# Patient Record
Sex: FEMALE | Race: WHITE | NOT HISPANIC OR LATINO | Employment: FULL TIME | URBAN - METROPOLITAN AREA
[De-identification: names, ages, dates, MRNs, and addresses within clinical notes are randomized per-mention and may not be internally consistent; named-entity substitution may affect disease eponyms.]

---

## 2025-03-21 ENCOUNTER — OFFICE VISIT (OUTPATIENT)
Dept: PAIN MEDICINE | Facility: CLINIC | Age: 75
End: 2025-03-21
Payer: MEDICARE

## 2025-03-21 DIAGNOSIS — M47.816 LUMBAR SPONDYLOSIS: Primary | ICD-10-CM

## 2025-03-21 DIAGNOSIS — M47.812 CERVICAL SPONDYLOSIS WITHOUT MYELOPATHY: ICD-10-CM

## 2025-03-21 PROBLEM — K74.60 CIRRHOSIS OF LIVER WITHOUT ASCITES (MULTI): Status: ACTIVE | Noted: 2023-01-09

## 2025-03-21 PROBLEM — K92.2 GI BLEED: Status: ACTIVE | Noted: 2021-02-12

## 2025-03-21 PROBLEM — I10 ESSENTIAL HYPERTENSION: Chronic | Status: ACTIVE | Noted: 2021-02-12

## 2025-03-21 PROCEDURE — 99214 OFFICE O/P EST MOD 30 MIN: CPT | Performed by: PAIN MEDICINE

## 2025-03-21 PROCEDURE — 99204 OFFICE O/P NEW MOD 45 MIN: CPT | Performed by: PAIN MEDICINE

## 2025-03-21 PROCEDURE — G2211 COMPLEX E/M VISIT ADD ON: HCPCS | Performed by: PAIN MEDICINE

## 2025-03-21 PROCEDURE — 1125F AMNT PAIN NOTED PAIN PRSNT: CPT | Performed by: PAIN MEDICINE

## 2025-03-21 PROCEDURE — 1159F MED LIST DOCD IN RCRD: CPT | Performed by: PAIN MEDICINE

## 2025-03-21 RX ORDER — BACLOFEN 10 MG/1
10 TABLET ORAL NIGHTLY
COMMUNITY
End: 2025-03-21 | Stop reason: SDUPTHER

## 2025-03-21 RX ORDER — CALCIUM CARBONATE 300MG(750)
400 TABLET,CHEWABLE ORAL DAILY
COMMUNITY

## 2025-03-21 RX ORDER — IBUPROFEN 100 MG/5ML
1000 SUSPENSION, ORAL (FINAL DOSE FORM) ORAL DAILY
COMMUNITY

## 2025-03-21 RX ORDER — LISINOPRIL AND HYDROCHLOROTHIAZIDE 20; 25 MG/1; MG/1
1 TABLET ORAL DAILY
COMMUNITY

## 2025-03-21 RX ORDER — ZINC GLUCONATE 50 MG
25 TABLET ORAL DAILY
COMMUNITY

## 2025-03-21 RX ORDER — VIT C/E/ZN/COPPR/LUTEIN/ZEAXAN 250MG-90MG
50 CAPSULE ORAL DAILY
COMMUNITY

## 2025-03-21 RX ORDER — BACLOFEN 10 MG/1
10 TABLET ORAL NIGHTLY
Qty: 30 TABLET | Refills: 11 | Status: SHIPPED | OUTPATIENT
Start: 2025-03-21 | End: 2026-03-21

## 2025-03-21 RX ORDER — PANTOPRAZOLE SODIUM 40 MG/1
40 TABLET, DELAYED RELEASE ORAL
COMMUNITY

## 2025-03-21 RX ORDER — NALOXONE HYDROCHLORIDE 4 MG/.1ML
1 SPRAY NASAL AS NEEDED
Qty: 2 EACH | Refills: 0 | Status: SHIPPED | OUTPATIENT
Start: 2025-03-21

## 2025-03-21 ASSESSMENT — PAIN DESCRIPTION - DESCRIPTORS: DESCRIPTORS: BURNING;SHARP;SHOOTING

## 2025-03-21 ASSESSMENT — PAIN SCALES - GENERAL: PAINLEVEL_OUTOF10: 8

## 2025-03-21 ASSESSMENT — PAIN - FUNCTIONAL ASSESSMENT: PAIN_FUNCTIONAL_ASSESSMENT: 0-10

## 2025-03-21 NOTE — PROGRESS NOTES
Subjective   Emilia Murray is a 74 y.o. female who presents for evaluation of her back and neck pain.  The pt had been referred from Dr. Soliz office to be seen here in our clinic. .     Past Medical History:   Diagnosis Date    Personal history of other diseases of the digestive system     History of gastrointestinal hemorrhage    Personal history of other diseases of the musculoskeletal system and connective tissue     History of chronic back pain     Past Surgical History:   Procedure Laterality Date    OTHER SURGICAL HISTORY  04/13/2021    Hysterectomy    OTHER SURGICAL HISTORY  04/13/2021    Splenectomy       I have reviewed the nurses notes and am aware of family/social history.     Review of Systems    Objective   Physical Exam    ASSESSMENT:     PLAN:   Discussed treatment plan with patient.   Call or return to clinic prn if these symptoms worsen or fail to improve as anticipated.     Samaria Kamara RN

## 2025-03-21 NOTE — H&P
History Of Present Illness  Emilia Murray is a 74 y.o. female presenting for evaluation of her back and neck pain.  The pt had been referred from Dr. Soliz office to be seen here in our clinic. .  Currently Dr. Stanton is retiring from the pain practice and he is referring the patient in order for me to take over her care she was under his care for over 10 years being managed with epidural steroid injections and medication.  On combination of norco 5/325 mg with 80 pills per months averaging 1 pill to 1 pill and 1/2/day and baclofen 10 mg at bedtime.  She has been off the hydrocodone since the end of February she has been also off the baclofen for a while.  Describing currently her pain as being a generalized ache worse in the lower back and neck area .  The pain seems to be triggered with every day activity including sitting standing cooking and working.  Rating her pain at the level of 8 out of 10 and at night will goes up to a 10 out of 10.  She believes that since she has been off the Norco her symptoms has been worse.  She did not have any recent physical therapy or any chiropractic manipulation.  Tried on Aleve and that was not tolerated secondary to a burning sensation in her stomach that she is switched to acetaminophen with some benefit.      Past Medical History  Past Medical History:   Diagnosis Date    Personal history of other diseases of the digestive system     History of gastrointestinal hemorrhage    Personal history of other diseases of the musculoskeletal system and connective tissue     History of chronic back pain     Patient Active Problem List   Diagnosis    Essential hypertension    GI bleed    Cirrhosis of liver without ascites (Multi)       Surgical History  Past Surgical History:   Procedure Laterality Date    OTHER SURGICAL HISTORY  04/13/2021    Hysterectomy    OTHER SURGICAL HISTORY  04/13/2021    Splenectomy     Social History  She has no history on file for tobacco use, alcohol use,  and drug use.    Family History  No pertaining family history      REVIEW OF LABORATORY DATA  I have reviewed the following lab results:  WBC   Date Value Ref Range Status   02/12/2021 10.1 4.4 - 11.3 x10E9/L Final     RBC   Date Value Ref Range Status   02/12/2021 4.48 4.00 - 5.20 x10E12/L Final     Hemoglobin   Date Value Ref Range Status   02/12/2021 13.8 12.0 - 16.0 g/dL Final     Hematocrit   Date Value Ref Range Status   02/12/2021 41.7 36.0 - 46.0 % Final     MCV   Date Value Ref Range Status   02/12/2021 93 80 - 100 fL Final     MCHC   Date Value Ref Range Status   02/12/2021 33.1 32.0 - 36.0 g/dL Final     RDW   Date Value Ref Range Status   02/12/2021 13.1 11.5 - 14.5 % Final     Platelets   Date Value Ref Range Status   02/12/2021 233 150 - 450 x10E9/L Final     Sodium   Date Value Ref Range Status   02/12/2021 142 136 - 145 mmol/L Final     Potassium   Date Value Ref Range Status   02/12/2021 3.4 (L) 3.5 - 5.3 mmol/L Final     Bicarbonate   Date Value Ref Range Status   02/12/2021 22 21 - 32 mmol/L Final     Urea Nitrogen   Date Value Ref Range Status   02/12/2021 17 6 - 23 mg/dL Final     Calcium   Date Value Ref Range Status   02/12/2021 9.8 8.6 - 10.3 mg/dL Final     Protime   Date Value Ref Range Status   02/12/2021 12.6 10.1 - 13.3 sec Final     INR   Date Value Ref Range Status   02/12/2021 1.1 0.9 - 1.1 Final           Allergies  No Known Allergies  Review of Systems   12 Systems have been reviewed as follows.   Constitutional: Fever, weight gain, weight loss, appetite change, night sweats, fatigue, chills.  Eyes : blurry, double vision, vision, loss, tearing, redness, pain, sensitivity to light, glaucoma.  Ears, nose, mouth, and throat: Hearing loss, ringing in the ears, ear pain, nasal congestion, nasal drainage, nosebleeds, mouth, throat, irritation tooth problem.  Cardiovascular :chest pain, pressure, heart tracing,palpaitations , sweating, leg swelling, high or low blood pressure  Pulmonary:  Cough, yellow or green sputum, blood and sputum, shortness of breath, wheezing  Gastrointestinal: Nause, vomiting, diarrhea, constipation, pain, blood in stool, or vomitus, heartburn, difficulty swallowing  Genitourinary: incontinence, abnormal bleeding, abnormal discharge, urinary frequency, urinary hesitancy, pain, impotence sexual problem, infection, urinary retention  Musculoskeletal: Pain, stiffness, joint, redness or warmth, arthritis, back pain, weakness, muscle wasting, sprain or fracture  Neuro: Weight weakness, dizziness, change in voice, change in taste change in vision, change in hearing, loss, or change of sensation, trouble walking, balance problems coordination problems, shaking, speech problem  Endocrine , cold or heat intolerance, blood sugar problem, weight gain or loss missed periods hot flashes, sweats, change in body hair, change in libido, increased thirst, increased urination  Heme/lymph: Swelling, bleeding, problem anemia, bruising, enlarged lymph nodes  Allergic/immunologic: H. plus nasal drip, watery itchy eyes, nasal drainage, immunosuppressed  The above, were reviewed and noted negative except as noted.     Physical Exam   Vital signs reviewed, documented in chart     General:  Appears well, does not look in any major distress  Alert    HEENT:  Head atraumatic  Eyes normal inspection  PERRL  Normal ENT inspection  No signs of dehydration    NECK:  Normal inspection  Range of motion within normal     RESPIRATORY:  No respiratory distress    CVS:  Heart rate and rhythm regular    ABDOMEN/GI  Soft  Non-tender  No distention  No organomegaly      BACK:  Normal inspection, flexion and extension within normal limit   Positive tenderness upon the palpation of the facet joint  Si joints none tender to palpations     EXTREMITIES:  Non-Tender  Full ROM  Normal appearance  No Pedal edema  Power symmetrical , sensory examination preserved.    NEURO:  Alert and oriented X 3  CNS normal as tested  without focal neurological deficit   Sensation normal  Motor normal  reflexes normal    PSYCH:  Mood normal  Affect normal    SKIN:  Color normal  No rash  Warm  Dry  no sign of skin marking supportive of IV drug usage /abuse.     Last Recorded Vitals  There were no vitals taken for this visit.  MRI of the lumbar spine area dated on March 2017 showed multilevel disc desiccation and broad-based multilevel posterior disc bulging without associated spinal canal stenosis posterior annular tear at L4-L5  X-ray of the cervical spine area dated on 1/22/2024 showed slight anterior listhesis of C5 with degenerative disc disease of the C6-C7 level and MRI of the cervical spine area dated on 2/26/2024 confirmed a grade 1 anterolisthesis of C2 on C3 C3 on C4 C4 on C5 likely secondary to laxity of the longitudinal ligament degenerative disc disease with disc bulges of the C3-C4-C5 C6 and C6-C7 level with mild central canal stenosis and facet hypertrophy was described  Assessment/Plan   74 years old with history and physical examination supportive of cervical spondylosis cervicalgia lumbago lumbar spondylosis    Plan  Discussed with the patient the different modalities available for the treatment of her condition I recommended for her to update her imaging for the lower lumbar spine area by obtaining an x-ray for the lumbar spine  I will also be recommending for the patient to initiate physical therapy targeting the neck and the lumbar spine area  If the pain persist despite the physical therapy then knowing that she was unable to tolerate the NSAIDs I would recommend a diagnostic medial nerve branch block to be performed under fluoroscopic guidance targeting the L3-L4 L4-L5 medial nerve branches and if she described positive response at that time she could be a candidate for the radiofrequency ablation of the medial nerve branches bilaterally at the level of the L3-L4 L4-L5 under fluoroscopic guidance  Knowing that the  patient's condition has deteriorated since that she has been taken off the Norco and the baclofen and knowing that she continues to work and she feels that the medication allowing her to have an improvement in her quality of life and in the pain control I would recommend to start her back on the baclofen 10 mg at bedtime and the Norco 5/325 mg one pill to a maximum of 1-1/2/day the patient will keep a pain diary and will be following up with me in 1 month duration I will be sharing with the patient's the opiate fact sheet and she was advised always take the least needed of the medication to keep her symptoms under control she understand that she needs to be bound by a opiate contract and that will be requiring pill count and toxicology screening on as needed basis to confirm her compliance  Patient is being treated with opioid therapy for pain and is responding appropriately.  There are NO signs of opioid intoxication, abuse, addiction or withdrawal.  Pupils are equal, reactive to light bilateral, appropriate speech and cognition. Patient denies any opioid induced constipation. The [place] registry followed periodically, urine toxicology completed and appropriate.     Patient is advised and warned  in specific detail about potential benefits of opioids along with risks and side effects including, but not limited to, dependency, addiction, tolerance, hyperalgesia, anxiety, depression, insomnia, endocrine changes, immunologic disturbances, respiratory depression and death.     Caution advised regarding the use of medications prescribed at this office and specific mention made regarding not to drive or operate heavy machinery if feeling side effects from this the medications. Patient  expressed an understanding in regards to these particular concerns.     Discussed non-opioid options including (But no limited), physical wellness, antiinflammatory diet, icing and heating, meditation, relaxation, massage and  acupuncture.    We will continue to monitor and adjust medications as needed.        The above clinical summary has been dictated with voice recognition software. It has not been proofread for grammatical errors, typographical mistakes, or other semantic inconsistencies.    Thank you for visiting our office today. It was our pleasure to take part in your healthcare.     Please do not hesitate to contact the pain clinic after your visit with any questions or concerns at  M-F 8-4 pm       Kelli Caba M.D.  Medical Director , Division of Pain Medicine [facility]   of Anesthesiology and Pain Medicine  [facility]     [place] [address] [address] [place] [address]     Office: [phone]  Fax: [phone]      Kelli Caba MD

## 2025-03-31 ENCOUNTER — APPOINTMENT (OUTPATIENT)
Dept: PHYSICAL THERAPY | Facility: CLINIC | Age: 75
End: 2025-03-31
Payer: COMMERCIAL

## 2025-04-04 ENCOUNTER — HOSPITAL ENCOUNTER (OUTPATIENT)
Dept: RADIOLOGY | Facility: HOSPITAL | Age: 75
Discharge: HOME | End: 2025-04-04
Payer: COMMERCIAL

## 2025-04-04 DIAGNOSIS — M47.816 LUMBAR SPONDYLOSIS: ICD-10-CM

## 2025-04-04 PROCEDURE — 72100 X-RAY EXAM L-S SPINE 2/3 VWS: CPT
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
## 2025-04-07 ENCOUNTER — EVALUATION (OUTPATIENT)
Dept: PHYSICAL THERAPY | Facility: CLINIC | Age: 75
End: 2025-04-07
Payer: COMMERCIAL

## 2025-04-07 DIAGNOSIS — M47.812 CERVICAL SPONDYLOSIS WITHOUT MYELOPATHY: ICD-10-CM

## 2025-04-07 DIAGNOSIS — R53.1 GENERAL WEAKNESS: Primary | ICD-10-CM

## 2025-04-07 DIAGNOSIS — M47.816 LUMBAR SPONDYLOSIS: ICD-10-CM

## 2025-04-07 PROCEDURE — 97110 THERAPEUTIC EXERCISES: CPT | Mod: GP | Performed by: PHYSICAL THERAPIST

## 2025-04-07 PROCEDURE — 97161 PT EVAL LOW COMPLEX 20 MIN: CPT | Mod: GP | Performed by: PHYSICAL THERAPIST

## 2025-04-07 ASSESSMENT — PAIN - FUNCTIONAL ASSESSMENT: PAIN_FUNCTIONAL_ASSESSMENT: 0-10

## 2025-04-08 NOTE — PROGRESS NOTES
Patient Name: Emilia Murray  MRN: [id number]  Today's Date: 4/14/2025  Time Calculation  Start Time: 1430  Stop Time: 1513  Time Calculation (min): 43 min     Diagnosis:  Problem List Items Addressed This Visit             ICD-10-CM    General weakness - Primary R53.1     Other Visit Diagnoses         Codes    Lumbar spondylosis     M47.816    Cervical spondylosis without myelopathy     M47.812            Insurance:  2/11  *2025 // AUTH FOR 10 VISITS, 4/11-7/9/25 // cervical, lumbar spondylosis, // 0% coins, no ded/oop/copay, 50v viviana yr (0v used as of 3/25/2025), PA req  *2025 // PA REQ / [employer] Authorization for 1 Visit   USE M47.816     Eval 75070 [id number] 4/2/25 to 4/16/25 Per [id number] 0% coins, no ded/oop/copay, 50v viviana yr (0v used as of 3/25/2025), PA [place]  [place] PPO (PA REQ)// Medicare A/B   20560 - 205261 not covered  $0 spent towards therapy cap as of 3/25/25.  KX mod needed after ~20v viviana yr.  (0v used as of 3/25/25)  20560 - 20561, 86933 not covered  POC: 4.7.25 - 7.6.25  Visit # 2    Precautions:  Precautions  Precautions Comment: no new falls    Subjective:  Patient sent email to report changes in her LUL answers  The 1st was the one that asked about doing routines at home. I answered that I can do them with pain.  Last night I was cooking dinner, and after standing for an hour I started feeling pain & discomfort, before i got to 2 hrs, I had to sit down and wait for the pain to ease a bit.  The 2nd asked about doing routines at work.  Today I was making a pattern and the same time frame for pain held    States that she tried to work in the yard and was unable and decided she has to call someone to assist with her yardwork.      Pain:  Pain Assessment: 0-10  0-10 (Numeric) Pain Score: 5 - Moderate pain  Pain Location:  (top of head to bra strap and low back.  Currently no burning pain down her legs.)    Objective:  Mod/severe STR B UT, interscapular    Outcome Measure:  nt    Treatment:  There ex  36157: 23/2  Nustep LE and UE x 3 min L1  Stair HS stretch 2 x :20  Supine TA x 5  Seated cervical retraction   Seated scapular retraction 5  Standing retraction rows RTB x 10   TA Hooklying hip ER with contralateral isometric RTB x 10   TA Hooklying hip add with ball x 10       Manual therapy 23359: 20/1  STM, MRF, TrP!  Supine  UT, Cervical paraspinals, gentle cervical distraction SOR  SL  Levator, UT, interscapular, scapular PROM scapular lift      HEP/Education:  Access Code: [email or web address]  URL: [email or web address]  Date: 04/14/2025  Prepared by: Mara Chacon    Exercises  - Hooklying Isometric Clamshell  - 1 x daily - 7 x weekly - 1 sets - 10 reps  - Supine Hip Adduction Isometric with Ball  - 1 x daily - 7 x weekly - 1 sets - 10 reps  - Standing Bilateral Low Shoulder Row with Anchored Resistance  - 1 x daily - 7 x weekly - 1 sets - 10 reps    Assessment:  Patient tolerated today's session well with noted soreness.  Significant STR noted during manual therapy with approx 20% reduction in tissue tightness post session.  Patient cont to have a fairly low tolerance to activity before pain increases and cont to require cues for correct there ex progression and TA activation. Patient cont to require additional skilled PT services for education, cues and instruction in there ex/HEP progression, manual therapy to address to ongoing joint and STR restriction, and gait and nmreed to progress functional independence and decrease risk of falls in order to achieve the patient's and PT goal.  Patient demonstrates a working understanding of treatment plan and HEP requirements.    Plan:  Progress with functional strength as tolerated with respect to tissue healing. Manual therapy PRN to improve/maintain ROM, prevent adhesion, reduce pain.

## 2025-04-14 ENCOUNTER — TREATMENT (OUTPATIENT)
Dept: PHYSICAL THERAPY | Facility: CLINIC | Age: 75
End: 2025-04-14
Payer: COMMERCIAL

## 2025-04-14 DIAGNOSIS — R53.1 GENERAL WEAKNESS: Primary | ICD-10-CM

## 2025-04-14 DIAGNOSIS — M47.816 LUMBAR SPONDYLOSIS: ICD-10-CM

## 2025-04-14 DIAGNOSIS — M47.812 CERVICAL SPONDYLOSIS WITHOUT MYELOPATHY: ICD-10-CM

## 2025-04-14 PROCEDURE — 97110 THERAPEUTIC EXERCISES: CPT | Mod: GP | Performed by: PHYSICAL THERAPIST

## 2025-04-14 PROCEDURE — 97140 MANUAL THERAPY 1/> REGIONS: CPT | Mod: GP | Performed by: PHYSICAL THERAPIST

## 2025-04-14 ASSESSMENT — PAIN - FUNCTIONAL ASSESSMENT: PAIN_FUNCTIONAL_ASSESSMENT: 0-10

## 2025-04-14 ASSESSMENT — PAIN SCALES - GENERAL: PAINLEVEL_OUTOF10: 5 - MODERATE PAIN

## 2025-04-15 NOTE — PROGRESS NOTES
Patient Name: Emilia Murray  MRN: [id number]  Today's Date: 4/21/2025  Time Calculation  Start Time: 1430  Stop Time: 1515  Time Calculation (min): 45 min     Diagnosis:  Problem List Items Addressed This Visit           ICD-10-CM    General weakness - Primary R53.1     Other Visit Diagnoses         Codes      Lumbar spondylosis     M47.816      Cervical spondylosis without myelopathy     M47.812            Insurance:  3/11  *2025 // AUTH FOR 10 VISITS, 4/11-7/9/25 // cervical, lumbar spondylosis, // 0% coins, no ded/oop/copay, 50v viviana yr (0v used as of 3/25/2025), PA req  *2025 // PA REQ / [employer] Authorization for 1 Visit   USE M47.816     Eval [id number] 4/2/25 to 4/16/25 Per [id number] 0% coins, no ded/oop/copay, 50v viviana yr (0v used as of 3/25/2025), PA req  [place] PPO (PA REQ)// Medicare A/B   20560 - 205261 not covered  $0 spent towards therapy cap as of 3/25/25.  KX mod needed after ~20v viviana yr.  (0v used as of 3/25/25)  20560 - 20561, 32059 not covered  POC: 4.7.25 - 7.6.25  Visit # 3    Precautions:  Precautions  Precautions Comment: no new falls    Subjective:  Reports intermittent compliance with HEP due to the Holiday weekend     Pain:  Pain Assessment: 0-10  0-10 (Numeric) Pain Score: 8  Pain Location: Back   Location: lower back, L hip.  States it may be the weather.    Description: reports tightness and achiness in her neck.     Objective:  Able to     Outcome Measure:  nt    Treatment:  There ex 90946: 25/2  Nustep LE and UE x :3:30 min L1  Stair HS stretch 2 x :20  Supine TA x 5  Seated cervical retraction   Seated scapular retraction 5  Standing retraction rows RTB x 10   TA Hooklying hip ER with contralateral isometric RTB x 10   TA Hooklying hip add with ball x 10   Pull downs GTB x 5  Paloff walk outs x 5 Rtubing     Body mechanics       Manual therapy 17111: 20/1  STM, MRF, TrP!  Supine  UT, Cervical paraspinals, gentle cervical distraction SOR  SL  Levator, UT, interscapular, scapular PROM  scapular lift     HEP/Education:  Pull downs  Paloff walk out  Issued BTB     Assessment:  Patient tolerated today's session well however cont to have generalized LB, thoracic and cervical pain.  Educated in TA contraction to assist with sit to stand transfers.  Patient demonstrates ongoing need for skilled PT to address the STR that are ongoing in her spine and there ex progression to include normalizing movement patterns and posture to achieve her goals.     Patient demonstrates a working understanding of treatment plan and HEP requirements and how PT plan of care relates to PT and personal goal achievement    Plan:  Progress with functional strength as tolerated with respect to tissue healing. Manual therapy PRN to improve/maintain ROM, prevent adhesion, reduce pain.

## 2025-04-21 ENCOUNTER — OFFICE VISIT (OUTPATIENT)
Dept: PAIN MEDICINE | Facility: CLINIC | Age: 75
End: 2025-04-21
Payer: COMMERCIAL

## 2025-04-21 ENCOUNTER — TREATMENT (OUTPATIENT)
Dept: PHYSICAL THERAPY | Facility: CLINIC | Age: 75
End: 2025-04-21
Payer: COMMERCIAL

## 2025-04-21 DIAGNOSIS — M54.16 RADICULOPATHY, LUMBAR REGION: Primary | ICD-10-CM

## 2025-04-21 DIAGNOSIS — M47.812 CERVICAL SPONDYLOSIS WITHOUT MYELOPATHY: ICD-10-CM

## 2025-04-21 DIAGNOSIS — R53.1 GENERAL WEAKNESS: Primary | ICD-10-CM

## 2025-04-21 DIAGNOSIS — M47.816 LUMBAR SPONDYLOSIS: ICD-10-CM

## 2025-04-21 PROCEDURE — 1125F AMNT PAIN NOTED PAIN PRSNT: CPT | Performed by: PAIN MEDICINE

## 2025-04-21 PROCEDURE — 97140 MANUAL THERAPY 1/> REGIONS: CPT | Mod: GP | Performed by: PHYSICAL THERAPIST

## 2025-04-21 PROCEDURE — 99214 OFFICE O/P EST MOD 30 MIN: CPT | Performed by: PAIN MEDICINE

## 2025-04-21 PROCEDURE — 1159F MED LIST DOCD IN RCRD: CPT | Performed by: PAIN MEDICINE

## 2025-04-21 PROCEDURE — G2211 COMPLEX E/M VISIT ADD ON: HCPCS | Performed by: PAIN MEDICINE

## 2025-04-21 PROCEDURE — 97110 THERAPEUTIC EXERCISES: CPT | Mod: GP | Performed by: PHYSICAL THERAPIST

## 2025-04-21 ASSESSMENT — PATIENT HEALTH QUESTIONNAIRE - PHQ9
1. LITTLE INTEREST OR PLEASURE IN DOING THINGS: NOT AT ALL
2. FEELING DOWN, DEPRESSED OR HOPELESS: NOT AT ALL
SUM OF ALL RESPONSES TO PHQ9 QUESTIONS 1 AND 2: 0

## 2025-04-21 ASSESSMENT — COLUMBIA-SUICIDE SEVERITY RATING SCALE - C-SSRS
1. IN THE PAST MONTH, HAVE YOU WISHED YOU WERE DEAD OR WISHED YOU COULD GO TO SLEEP AND NOT WAKE UP?: NO
2. HAVE YOU ACTUALLY HAD ANY THOUGHTS OF KILLING YOURSELF?: NO
6. HAVE YOU EVER DONE ANYTHING, STARTED TO DO ANYTHING, OR PREPARED TO DO ANYTHING TO END YOUR LIFE?: NO

## 2025-04-21 ASSESSMENT — PAIN - FUNCTIONAL ASSESSMENT
PAIN_FUNCTIONAL_ASSESSMENT: 0-10
PAIN_FUNCTIONAL_ASSESSMENT: 0-10

## 2025-04-21 ASSESSMENT — PAIN DESCRIPTION - DESCRIPTORS: DESCRIPTORS: ACHING

## 2025-04-21 ASSESSMENT — PAIN SCALES - GENERAL
PAINLEVEL_OUTOF10: 8
PAINLEVEL_OUTOF10: 8

## 2025-04-21 NOTE — H&P
GENERAL SURGERY  FOLLOW-UP CLINIC NOTE      CHIEF COMPLAINT:    Chief Complaint   Patient presents with   • Follow-up     rectal pain       HISTORY OF PRESENT ILLNESS:  Mr. Mccormack returns today for follow-up of his rectal pain.  His last visit, he was having significant rectal pain as well as back and leg pain. He was doing sitz baths and had recently stopped his MiraLAX. We started him on a 2 week course of Anusol suppositories. Since that time, his pain is significantly better. He still feels that he has some spasms in his anus. The back and leg pain have resolved. He does notice some worsening of the anal spasms with activity. He is having daily bowel movements, but it is not quite as regular like clockwork as it was prior to his stroke.  He sometimes has the urge to defecate but tries to go and cannot. He is not currently taking any fiber or MiraLAX since he is going daily.  He denies any rectal bleeding.      PHYSICAL EXAM:   Visit Vitals  /72   Pulse 82   Resp 16       GENERAL:  No acute distress, alert and oriented x3.  LUNGS:  Normal respiratory effort, breathing unlabored.  Rest of exam deferred.      ASSESSMENT:  63 yo M with anal pain and internal hemorrhoids, improving after course of anusol suppositories.  It is unclear what caused his initial troubles or if it was related to his hospitalization for his stroke. His symptoms seemed to be more intense than what would be explained just by internal hemorrhoids.  The spasms make it sound more like levator ani syndrome or proctalgia fugax.        PLAN:  Since he continues to improve, we will hold the course.  He will call the office with any worsening symptoms or with any questions or concerns.        Kinga Daly MD           History Of Present Illness  Emilia Murray is a 74 y.o. female presenting with chronic back pain   Here  to follow up for back pain, is participating in PT. Would like to discuss back injections.   She does not feel that the physical therapy has provided her so far with any significant improvement complaining currently of the pain across her lower lumbar spine area and she is interested in any intervention her last injection through Dr. Stanton was performed 4 months ago ,December 19 ,2024 at that time her injection.  Complaining of the pain radiating down to the right lower extremity aggravated with activity.  Rating currently her pain between 7 and 8 out of 10.  Described as a shooting sensation during movement     Past Medical History  Medical History[1]  Surgical History  Surgical History[2]  Social History  She has no history on file for tobacco use, alcohol use, and drug use.    Family History  Family History[3]     Allergies  Allergies[4]  Review of Systems   All 13 systems were reviewed and are within normal levels except as noted below or per HPI. Positive and pertinent negative responses are noted below or in the HPI   Denied any fever or chills. No weight loss and no night sweats. No cough or sputum production. No diarrhea   No constipation  No bladder and bowel incontinence and no other changes in bladder and bowel. No skin changes.   Denied opioids diversion and abuse and denies alcoholism. Denies overuse of  pain medications.    Physical Exam       Past medical history no interval changes has been noted    On physical examination    General   Alert, oriented x3 pleasant and cooperative. Does not look in any major distress.    HEENT  Pupils normal in size. Ears, nose, mouth, and throat appear to be in normal condition.  Head atraumatic      No signs of sedation or signs of withdrawal apparent.    Psychiatric   No signs of depression apparent.    Neuro   No focal neurological deficit apparent. Ambulation  at baseline.      Respiratory  No respiratory distress     Abdomen  no distention     Skin  No skin markings supportive of recent IV drug usage .    Cardiovascular  Regular rate and rhythm     Last Recorded Vitals  There were no vitals taken for this visit.  REVIEW OF LABORATORY DATA  I have reviewed the following lab results:  WBC   Date Value Ref Range Status   02/12/2021 10.1 4.4 - 11.3 x10E9/L Final     RBC   Date Value Ref Range Status   02/12/2021 4.48 4.00 - 5.20 x10E12/L Final     Hemoglobin   Date Value Ref Range Status   02/12/2021 13.8 12.0 - 16.0 g/dL Final     Hematocrit   Date Value Ref Range Status   02/12/2021 41.7 36.0 - 46.0 % Final     MCV   Date Value Ref Range Status   02/12/2021 93 80 - 100 fL Final     MCHC   Date Value Ref Range Status   02/12/2021 33.1 32.0 - 36.0 g/dL Final     RDW   Date Value Ref Range Status   02/12/2021 13.1 11.5 - 14.5 % Final     Platelets   Date Value Ref Range Status   02/12/2021 233 150 - 450 x10E9/L Final     Sodium   Date Value Ref Range Status   02/12/2021 142 136 - 145 mmol/L Final     Potassium   Date Value Ref Range Status   02/12/2021 3.4 (L) 3.5 - 5.3 mmol/L Final     Bicarbonate   Date Value Ref Range Status   02/12/2021 22 21 - 32 mmol/L Final     Urea Nitrogen   Date Value Ref Range Status   02/12/2021 17 6 - 23 mg/dL Final     Calcium   Date Value Ref Range Status   02/12/2021 9.8 8.6 - 10.3 mg/dL Final     Protime   Date Value Ref Range Status   02/12/2021 12.6 10.1 - 13.3 sec Final     INR   Date Value Ref Range Status   02/12/2021 1.1 0.9 - 1.1 Final         REVIEW OF RADIOLOGY   I have reviewed the following:  Radiology Studies               XR lumbar spine 2-3 views  Result Date: 4/5/2025  Interpreted By:  Donato Patiño, STUDY: XR LUMBAR SPINE 2-3 VIEWS; ;  4/4/2025 11:45 am   INDICATION: Signs/Symptoms:back pain.   ,M47.816 Spondylosis without myelopathy or radiculopathy, lumbar region   COMPARISON: None.   ACCESSION NUMBER(S): [id number]    ORDERING CLINICIAN: PAULINA ESTRADA   FINDINGS: Lumbar spine, three views   There is mild dextrocurvature of the lumbar spine. Mild anterolisthesis L3 on L4. There is moderate disc space narrowing osteophytosis at L1-L2 there is moderate facet disease lower lumbar spine       Moderate spondylosis L1-L2. Moderate facet disease lower lumbar spine Mild anterolisthesis L3 on L4   MACRO: None   Signed by: Donato Patiño 4/5/2025 9:14 AM Dictation workstation:   [id number]      Assessment/Plan   74 years old with history and physical examination supportive of chronic back pain and lumbar radiculopathy tried and failed conservative management with physical therapy and over-the-counter nonsteroidal anti-inflammatory    Plan  I advised the patient that I would recommend for her a lumbar epidural steroid injection targeting the L4 for L5 or the L3-L4 level to be performed under fluoroscopic guidance with injection of contrast material I will reevaluate the patient after the performance of the epidural for further recommendation as her case progressed  Discussed procedure risks/benefits in detail with patient. Patient meets medical necessity for procedure due to failure of conservative measures. Reviewed procedural risks including bleeding, infection, nerve damage, paralysis and other complications . Also reviewed mitigating factors such as screening for infection,blood thinner use, sterile precautions, and image-guidance when applicable. All questions answered. Patient  expressed understanding and choose to proceed.      The above clinical summary has been dictated with voice recognition software. It has not been proofread for grammatical errors, typographical mistakes, or other semantic inconsistencies.    Thank you for visiting our office today. It was our pleasure to take part in your healthcare.     Please do not hesitate to contact the pain clinic after your visit with any questions or concerns at  M-F  8-4 pm       Kelli Caba M.D.  Medical Director , Division of Pain Medicine [facility]   of Anesthesiology and Pain Medicine  [facility]     [place] [id number] [facility] [address] [place] [id number]     Office: [phone]  Fax: [phone]      Kelli Caba MD       [1]   Past Medical History:  Diagnosis Date    Personal history of other diseases of the digestive system     History of gastrointestinal hemorrhage    Personal history of other diseases of the musculoskeletal system and connective tissue     History of chronic back pain   [2]   Past Surgical History:  Procedure Laterality Date    OTHER SURGICAL HISTORY  04/13/2021    Hysterectomy    OTHER SURGICAL HISTORY  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  [4] No Known Allergies

## 2025-04-22 ENCOUNTER — TELEPHONE (OUTPATIENT)
Dept: PAIN MEDICINE | Facility: CLINIC | Age: 75
End: 2025-04-22
Payer: COMMERCIAL

## 2025-04-22 DIAGNOSIS — M47.816 LUMBAR SPONDYLOSIS: Primary | ICD-10-CM

## 2025-04-22 RX ORDER — HYDROCODONE BITARTRATE AND ACETAMINOPHEN 5; 325 MG/1; MG/1
1 TABLET ORAL 3 TIMES DAILY PRN
Qty: 80 TABLET | Refills: 0 | Status: SHIPPED | OUTPATIENT
Start: 2025-05-22 | End: 2025-06-21

## 2025-04-22 RX ORDER — HYDROCODONE BITARTRATE AND ACETAMINOPHEN 5; 325 MG/1; MG/1
1 TABLET ORAL 3 TIMES DAILY PRN
Qty: 80 TABLET | Refills: 0 | Status: SHIPPED | OUTPATIENT
Start: 2025-04-22 | End: 2025-05-22

## 2025-04-22 NOTE — TELEPHONE ENCOUNTER
Patient called to state she checked with her pharmacy and she does not have a prescription for her hydrocodone like she thought. Patient is calling in today to request a refill be sent to [facility] in [place]

## 2025-04-24 NOTE — PROGRESS NOTES
Patient Name: Emilia Murray  MRN: [id number]  Today's Date: 4/28/2025        Diagnosis:  Problem List Items Addressed This Visit           ICD-10-CM    General weakness - Primary R53.1       Insurance:  4/11 *2025 // AUTH FOR 10 VISITS, 4/11-7/9/25 // cervical, lumbar spondylosis, // 0% coins, no ded/oop/copay, 50v viviana yr (0v used as of 3/25/2025), PA req  *2025 // PA REQ / [employer] Authorization for 1 Visit   USE M47.816     Eval 09637 [id number] 4/2/25 to 4/16/25 Per [id number] 0% coins, no ded/oop/copay, 50v viviana yr (0v used as of 3/25/2025), PA req  [place] PPO (PA REQ)// Medicare A/B   20560 - 205261 not covered  $0 spent towards therapy cap as of 3/25/25.  KX mod needed after ~20v viviana yr.  (0v used as of 3/25/25)  20560 - 20561, 56792 not covered  POC: 4.7.25 - 7.6.25  Visit # 4    Precautions:       Subjective:  ***    Pain:      Location: ***   Description: ***   Aggravating Factors: {Aggravating Factors:94498}   Relieving Factors:  {Relieving Factors:99569}    Objective:  ***    Outcome Measure:  {PT Outcome Measures:70644}     Treatment:  There ex 08267: 25/2  Nustep LE and UE x :3:30 min L1  Stair HS stretch 2 x :20  Supine TA x 5  Seated cervical retraction   Seated scapular retraction 5  Standing retraction rows RTB x 10   TA Hooklying hip ER with contralateral isometric RTB x 10   TA Hooklying hip add with ball x 10   Pull downs GTB x 5  Paloff walk outs x 5 Rtubing     Body mechanics        Manual therapy 97550: 20/1  STM, MRF, TrP!  Supine  UT, Cervical paraspinals, gentle cervical distraction SOR  SL  Levator, UT, interscapular, scapular PROM scapular lift     HEP/Education:  Pull downs  Paloff walk out  Issued BTB      HEP/Education:  ***    Assessment:  Patient tolerated today's session *** Patient demonstrates ***.    Patient demonstrates a working understanding of treatment plan and HEP requirements and how PT plan of care relates to PT and personal goal achievement    Plan:  Progress with  functional strength as tolerated with respect to tissue healing. Manual therapy PRN to improve/maintain ROM, prevent adhesion, reduce pain.

## 2025-04-28 ENCOUNTER — APPOINTMENT (OUTPATIENT)
Dept: PHYSICAL THERAPY | Facility: CLINIC | Age: 75
End: 2025-04-28
Payer: COMMERCIAL

## 2025-04-28 DIAGNOSIS — R53.1 GENERAL WEAKNESS: Primary | ICD-10-CM

## 2025-05-01 NOTE — PROGRESS NOTES
Patient Name: Emilia Murray  MRN: [id number]  Today's Date: 5/9/2025  Time Calculation  Start Time: 0702  Stop Time: 0744  Time Calculation (min): 42 min     Diagnosis:  Problem List Items Addressed This Visit           ICD-10-CM    General weakness - Primary R53.1     Other Visit Diagnoses         Codes      Lumbar spondylosis     M47.816      Cervical spondylosis without myelopathy     M47.812            Insurance:  4/11  *2025 // AUTH FOR 10 VISITS, 4/11-7/9/25 // cervical, lumbar spondylosis, // 0% coins, no ded/oop/copay, 50v viviana yr (0v used as of 3/25/2025), PA req  *2025 // PA REQ / [employer] Authorization for 1 Visit   USE M47.816     Eval 51135 [id number] 4/2/25 to 4/16/25 Per [id number] 0% coins, no ded/oop/copay, 50v viviana yr (0v used as of 3/25/2025), PA req  [place] PPO (PA REQ)// Medicare A/B   20560 - 205261 not covered  $0 spent towards therapy cap as of 3/25/25.  KX mod needed after ~20v viviana yr.  (0v used as of 3/25/25)  20560 - 20561, 08190 not covered  POC: 4.7.25 - 7.6.25  Visit # 4    Precautions:  Precautions  Precautions Comment: no new falls    Subjective:  Patient reports that she has been compliant with her HEP as she could since she had a tendon release surgery on her R hand 8 days ago.  Reports ongoing LBP but does report an improvement in her upper back.  She does report some increase in neck pain and dizziness yesterday but not sure why it flared up.      Pain:  Pain Assessment: 0-10  0-10 (Numeric) Pain Score: 4  Pain Location: Neck   Location: low back 5/10.     Description: sore and tight.   Reports she is better, she is just out of bed.      Objective:  Improved postural awareness - able to stand upright without cues  Required Sunitha for C-C ex to maintain control     Outcome Measure:  nt    Treatment:  There ex 66773: 23/2  Nustep LE only 5 min L 1  Stair HS stretch 2 x :20  C-C 7.5# 4 directions x 5  Supine TA x 5  Seated cervical retraction   Seated scapular retraction 5  Standing  retraction rows RTB x 10  NV  TA Hooklying hip ER with contralateral isometric BTB x :60  TA Hooklying hip add with ball x 10   Pull downs GTB x 5 NV  Paloff walk outs x 5 Rtubing   NV  Body mechanics - TA stability with standing        Manual therapy 71808: 19/1  STM, MRF, TrP!  Supine  UT, Cervical paraspinals, gentle cervical distraction SOR  SB slight lumbar distraction and trunk rotation   SL NOT completed   Levator, UT, interscapular, scapular PROM scapular lift     HEP/Education:  Upright posture - no new ex      Assessment:  Patient tolerated today's session well.  Ex were modified to accommodate her lack of availability to use her R UE.   Patient demonstrates gradually improving upper quarter pain and STR as well as improved sit to stand and upright posture.  She cont to have LBP and is going to receive an injection next week.   Ongoing skilled PT cont to be indicated for guided there ex progression and manual therapy .  Patient demonstrates a working understanding of treatment plan and HEP requirements and how PT plan of care relates to PT and personal goal achievement    Plan:  Progress with functional strength as tolerated with respect to tissue healing. Manual therapy PRN to improve/maintain ROM, prevent adhesion, reduce pain.

## 2025-05-09 ENCOUNTER — TREATMENT (OUTPATIENT)
Dept: PHYSICAL THERAPY | Facility: CLINIC | Age: 75
End: 2025-05-09
Payer: COMMERCIAL

## 2025-05-09 DIAGNOSIS — R53.1 GENERAL WEAKNESS: Primary | ICD-10-CM

## 2025-05-09 DIAGNOSIS — M47.812 CERVICAL SPONDYLOSIS WITHOUT MYELOPATHY: ICD-10-CM

## 2025-05-09 DIAGNOSIS — M47.816 LUMBAR SPONDYLOSIS: ICD-10-CM

## 2025-05-09 PROCEDURE — 97140 MANUAL THERAPY 1/> REGIONS: CPT | Mod: GP | Performed by: PHYSICAL THERAPIST

## 2025-05-09 PROCEDURE — 97110 THERAPEUTIC EXERCISES: CPT | Mod: GP | Performed by: PHYSICAL THERAPIST

## 2025-05-09 ASSESSMENT — PAIN SCALES - GENERAL: PAINLEVEL_OUTOF10: 4

## 2025-05-09 ASSESSMENT — PAIN - FUNCTIONAL ASSESSMENT: PAIN_FUNCTIONAL_ASSESSMENT: 0-10

## 2025-05-09 NOTE — PROGRESS NOTES
Patient Name: Emilia Murray  MRN: [id number]  Today's Date: 5/16/2025  Time Calculation  Start Time: 0720  Stop Time: 0745  Time Calculation (min): 25 min     Diagnosis:  Problem List Items Addressed This Visit           ICD-10-CM    General weakness - Primary R53.1     Other Visit Diagnoses         Codes      Lumbar spondylosis     M47.816      Cervical spondylosis without myelopathy     M47.812            Insurance:  5/11 *2025 // AUTH FOR 10 VISITS, 4/11-7/9/25 // cervical, lumbar spondylosis, // 0% coins, no ded/oop/copay, 50v viviana yr (0v used as of 3/25/2025), PA req  *2025 // PA REQ / [employer] Authorization for 1 Visit   USE M47.816     Eval 78110 [id number] 4/2/25 to 4/16/25 Per [id number] 0% coins, no ded/oop/copay, 50v viviana yr (0v used as of 3/25/2025), PA req  [place] PPO (PA REQ)// Medicare A/B   20560 - 205261 not covered  $0 spent towards therapy cap as of 3/25/25.  KX mod needed after ~20v viviana yr.  (0v used as of 3/25/25)  20560 - 20561, 80853 not covered  POC: 4.7.25 - 7.6.25  Visit # 5    Precautions:  Precautions  Precautions Comment: no new falls    Subjective:  Reports that she had her injection on Tuesday and is feeling better.  She is compliant with HEP as able with her R hand.   Reports some dizziness recently.  States that the posture exercises have been really helpful.      Pain:  Pain Assessment: 0-10  0-10 (Numeric) Pain Score: 0 - No pain    Objective:  Mod B UT tightness,   Able to scapular lift  Good upright posture without cues    Outcome Measure:  nt    Treatment:  There ex 63640: 0/0 - patient arrived late for appointment  Nustep LE only 5 min L 1  Stair HS stretch 2 x :20  C-C 7.5# 4 directions x 5  Supine TA x 5  Seated cervical retraction   Seated scapular retraction 5  Standing retraction rows RTB x 10  NV  TA Hooklying hip ER with contralateral isometric BTB x :60  TA Hooklying hip add with ball x 10   Pull downs GTB x 5 NV  Brandenff walk outs x 5 Rtubing   NV  Body mechanics - TA  stability with standing        Manual therapy 62705: 25/2  STM, MRF, TrP!  Supine  UT, Cervical paraspinals, gentle cervical distraction SOR  SL  Levator, UT, interscapular, scapular PROM scapular lift     HEP/Education:  Kegel exercises        Assessment:  Patient tolerated today's session well. She arrived late due to traffic issues and requested manual therapy this date as she can complete most ex at home.   Patient demonstrates improved upright posture and slowly decreasing STR.  She is progressing well.    Patient demonstrates a working understanding of treatment plan and HEP requirements and how PT plan of care relates to PT and personal goal achievement    Plan:  Progress with functional strength as tolerated with respect to tissue healing. Manual therapy PRN to improve/maintain ROM, prevent adhesion, reduce pain.

## 2025-05-13 ENCOUNTER — HOSPITAL ENCOUNTER (OUTPATIENT)
Dept: PAIN MEDICINE | Facility: CLINIC | Age: 75
Discharge: HOME | End: 2025-05-13
Payer: COMMERCIAL

## 2025-05-13 VITALS
SYSTOLIC BLOOD PRESSURE: 150 MMHG | DIASTOLIC BLOOD PRESSURE: 62 MMHG | OXYGEN SATURATION: 97 % | RESPIRATION RATE: 18 BRPM | HEART RATE: 62 BPM

## 2025-05-13 DIAGNOSIS — M54.16 RADICULOPATHY, LUMBAR REGION: ICD-10-CM

## 2025-05-13 PROCEDURE — 62323 NJX INTERLAMINAR LMBR/SAC: CPT | Performed by: PAIN MEDICINE

## 2025-05-13 PROCEDURE — 2550000001 HC RX 255 CONTRASTS: Mod: JZ | Performed by: PAIN MEDICINE

## 2025-05-13 PROCEDURE — 2500000004 HC RX 250 GENERAL PHARMACY W/ HCPCS (ALT 636 FOR OP/ED): Performed by: PAIN MEDICINE

## 2025-05-13 RX ORDER — BUPIVACAINE HYDROCHLORIDE 2.5 MG/ML
INJECTION, SOLUTION EPIDURAL; INFILTRATION; INTRACAUDAL; PERINEURAL AS NEEDED
Status: DISCONTINUED | OUTPATIENT
Start: 2025-05-13 | End: 2025-05-14 | Stop reason: HOSPADM

## 2025-05-13 RX ORDER — LIDOCAINE HYDROCHLORIDE 10 MG/ML
INJECTION, SOLUTION EPIDURAL; INFILTRATION; INTRACAUDAL; PERINEURAL AS NEEDED
Status: DISCONTINUED | OUTPATIENT
Start: 2025-05-13 | End: 2025-05-14 | Stop reason: HOSPADM

## 2025-05-13 RX ORDER — METHYLPREDNISOLONE ACETATE 80 MG/ML
INJECTION, SUSPENSION INTRA-ARTICULAR; INTRALESIONAL; INTRAMUSCULAR; SOFT TISSUE AS NEEDED
Status: DISCONTINUED | OUTPATIENT
Start: 2025-05-13 | End: 2025-05-14 | Stop reason: HOSPADM

## 2025-05-13 RX ADMIN — IOHEXOL 10 ML: 300 INJECTION, SOLUTION INTRAVENOUS at 09:07

## 2025-05-13 RX ADMIN — BUPIVACAINE HYDROCHLORIDE 10 ML: 2.5 INJECTION, SOLUTION EPIDURAL; INFILTRATION; INTRACAUDAL; PERINEURAL at 09:07

## 2025-05-13 RX ADMIN — METHYLPREDNISOLONE ACETATE 80 MG: 80 INJECTION, SUSPENSION INTRA-ARTICULAR; INTRALESIONAL; INTRAMUSCULAR; SOFT TISSUE at 09:07

## 2025-05-13 RX ADMIN — LIDOCAINE HYDROCHLORIDE 2 ML: 10 INJECTION, SOLUTION EPIDURAL; INFILTRATION; INTRACAUDAL; PERINEURAL at 09:07

## 2025-05-13 ASSESSMENT — PAIN DESCRIPTION - DESCRIPTORS: DESCRIPTORS: SHARP;SHOOTING

## 2025-05-13 ASSESSMENT — COLUMBIA-SUICIDE SEVERITY RATING SCALE - C-SSRS
1. IN THE PAST MONTH, HAVE YOU WISHED YOU WERE DEAD OR WISHED YOU COULD GO TO SLEEP AND NOT WAKE UP?: NO
6. HAVE YOU EVER DONE ANYTHING, STARTED TO DO ANYTHING, OR PREPARED TO DO ANYTHING TO END YOUR LIFE?: NO
2. HAVE YOU ACTUALLY HAD ANY THOUGHTS OF KILLING YOURSELF?: NO

## 2025-05-13 ASSESSMENT — PAIN SCALES - GENERAL: PAINLEVEL_OUTOF10: 7

## 2025-05-13 ASSESSMENT — PAIN - FUNCTIONAL ASSESSMENT: PAIN_FUNCTIONAL_ASSESSMENT: 0-10

## 2025-05-13 NOTE — DISCHARGE INSTRUCTIONS
Post-injection instructions:    Your pain may not be gone immediately after the procedure--it usually takes the steroid 3-5 days to start working.   It may take several weeks for the medicine to reach its' full effect.   Pay attention to how much pain relief (what percentage compared to before the procedure) you get and for how long it lasts.     Activity: Avoid strenuous activity for 24 hours. After that return to your normal activity level.     Bandages: Remove after 24 hours     Showering/Bathing: You may shower after bandage is removed     Follow up: CALL OFFICE IN 7 DAYS [phone] LEAVE MESSAGE ABOUT THE RELIEF THAT WAS OBTAINED      Call the doctor immediately: if you notice:     Excessive bleeding from procedure site (brisk bright red bleeding from the site or bleeding that soaks the bandages or does not stop)   Severe headache  Inability to walk, leg or arm weakness or numbness that is worse after the procedure   Uncontrolled pain   New urinary or fecal incontinence   Signs of infection: Fever above 101.5F, redness, swelling, pus or drainage from the site    Epidural Injection    Why is this procedure done?  With an epidural injection, the doctor injects drugs deep into the area around your spinal cord. This is different than epidural anesthesia that is used for surgery or when a woman has a baby. Your spine is a group of bones in your back that protect the nerves in your spinal cord. Problems with your spine can cause swollen nerves in the spinal cord. This swelling leads to pain and can limit movement. In an epidural injection, the doctor may give you a drug to help with swelling and pain.  You may have an epidural injection in different parts of your back, based on where your pain is. For pain in your head or arms, you may have a cervical epidural injection. If your pain is in your upper or middle back, you may get a thoracic epidural injection. For pain in your lower back or legs, you may get a  lumbar epidural injection.    What will the results be?  The treatment may:  Lower pain  Reduce swelling in the nerves  Improve movement  What happens before the procedure?  Your doctor will take your history. Talk to the doctor about:  All the drugs you are taking. Be sure to include all prescription and over-the-counter (OTC) drugs, and herbal supplements. Tell the doctor about any drug allergy. Bring a list of drugs you take with you.  If you have high blood sugar or diabetes. Your drugs may need to be changed.  Any bleeding problems. Be sure to tell your doctor if you are taking any drugs that may cause bleeding. Some of these are warfarin, rivaroxaban, apixaban, ticagrelor, clopidogrel, ketorolac, ibuprofen, naproxen, or aspirin. Certain vitamins and herbs, such as garlic and fish oil, may also add to the risk for bleeding. You may need to stop these drugs as well. Talk to your doctor about them.  Tell the doctor if you are pregnant.  You will not be allowed to drive right away after the procedure. Ask a family member or a friend to drive you home.  What happens during the procedure?  To help the doctor make sure the drugs are being injected in the right place, your doctor may do an x-ray of your spine. Other times your doctor may do a continuous x-ray during the procedure. This is a fluoroscopy. The doctor may also use a colored dye or a contrast dye to check where to inject the drug.  You may be given a drug to help you relax. You may be given a drug to make the area of the injection numb.  The doctor will clean the skin on your back or neck. This helps prevent infection.  The doctor will put a needle through the skin toward your spine. The drug will be injected into a space near the spine.  The needle will be taken out and a bandage will be placed over the injection site.  What happens after the procedure?  Staff will check on you to make sure you are doing well. They will tell you when you can go  home.  What care is needed at home?  Relax on the day of the injection.  Do not drive or run machines for at least 12 hours afterwards.  Apply ice to the injection site. Place an ice pack or a bag of frozen peas wrapped in a towel over the painful part. Never put ice right on the skin. Do not leave the ice on more than 10 to 15 minutes at a time.  It may take a few days before you will feel the effects of the injection.  What follow-up care is needed?  Your doctor may ask you to make visits to the office to check on your progress. Be sure to keep these visits. You may also need to see a physical therapist (PT). The PT will teach you exercises to help you get back your strength and motion. Ask your doctor when you can exercise.  What problems could happen?  Bleeding  Infection (rare)  Headache  Nerve injury  If you have diabetes, your blood sugar can go up after the injection. Check with your doctor if you need more treatment for this.  Last Reviewed Date

## 2025-05-16 ENCOUNTER — TREATMENT (OUTPATIENT)
Dept: PHYSICAL THERAPY | Facility: CLINIC | Age: 75
End: 2025-05-16
Payer: COMMERCIAL

## 2025-05-16 DIAGNOSIS — R53.1 GENERAL WEAKNESS: Primary | ICD-10-CM

## 2025-05-16 DIAGNOSIS — M47.816 LUMBAR SPONDYLOSIS: ICD-10-CM

## 2025-05-16 DIAGNOSIS — M47.812 CERVICAL SPONDYLOSIS WITHOUT MYELOPATHY: ICD-10-CM

## 2025-05-16 PROCEDURE — 97140 MANUAL THERAPY 1/> REGIONS: CPT | Mod: GP | Performed by: PHYSICAL THERAPIST

## 2025-05-16 ASSESSMENT — PAIN SCALES - GENERAL: PAINLEVEL_OUTOF10: 0 - NO PAIN

## 2025-05-16 ASSESSMENT — PAIN - FUNCTIONAL ASSESSMENT: PAIN_FUNCTIONAL_ASSESSMENT: 0-10

## 2025-05-19 NOTE — PROGRESS NOTES
Patient Name: Emilia Murray  MRN: [id number]  Today's Date: 5/23/2025  Time Calculation  Start Time: 1045  Stop Time: 1135  Time Calculation (min): 50 min     Diagnosis:  Problem List Items Addressed This Visit           ICD-10-CM    General weakness - Primary R53.1     Other Visit Diagnoses         Codes      Lumbar spondylosis     M47.816      Cervical spondylosis without myelopathy     M47.812            Insurance:  6/11 *2025 // AUTH FOR 10 VISITS, 4/11-7/9/25 // cervical, lumbar spondylosis, // 0% coins, no ded/oop/copay, 50v viviana yr (0v used as of 3/25/2025), PA req  *2025 // PA REQ / Jaqui Authorization for 1 Visit   USE M47.816     Eval [id number] 4/2/25 to 4/16/25 Per [id number] 0% coins, no ded/oop/copay, 50v viviana yr (0v used as of 3/25/2025), PA car[place]  [place] PPO (PA REQ)// Medicare A/B   20560 - 205261 not covered  $0 spent towards therapy cap as of 3/25/25.  KX mod needed after ~20v viviana yr.  (0v used as of 3/25/25)  20560 - 20561, 17341 not covered  POC: 4.7.25 - 7.6.25  Visit # 6    Precautions:  Precautions  Precautions Comment: no new falls    Subjective:  Reports that she has been doing well.  Reports some general achiness from returning to her HEP since her hand is healing better.   Reports LBP is still good from her injection a few weeks ago.     Pain:  Pain Assessment: 0-10  0-10 (Numeric) Pain Score: 0 - No pain  Pain Location: Neck   Location: denies pain the date - just achiness    Objective:  Stands with erect upright posture without cues    Outcome Measure:  nt    Treatment:  There ex 36349: 28/2  Nustep LE and UE only 5 min L 2 -   Stair HS stretch 2 x :20  C-C 7.5# fwd and retro x 5, 10# lateral stepping x 5 with Sunitha  Pull downs GTB x 10  Supine horiz abd RTB x 10   No completed  Supine TA x 5  Seated cervical retraction   Seated scapular retraction 5  Standing retraction rows RTB x 10  NV  TA Hooklying hip ER with contralateral isometric BTB x :60  TA Hooklying hip add with ball x 10    Dalia walk outs x 5 Rtubing   NV  Body mechanics - TA stability with standing        Manual therapy 47575: 22/1  STM, MRF, TrP!  Supine  UT, Cervical paraspinals, gentle cervical distraction SOR  SL  Levator, UT, interscapular, scapular PROM scapular lift     HEP/Education:  Kegel exercises     Assessment:  Patient tolerated today's session very well.  She is compliant with HEP and is very motivated to participate.  Patient demonstrates improved upright posture and is indep with her ability to self correct.  She is progressing in her tolerance to there ex however she cont to have TA, hip and scapular weakness which affects her ability to perform her job without pain.    Patient demonstrates a working understanding of treatment plan and HEP requirements and how PT plan of care relates to PT and personal goal achievement    Plan:  Progress with functional strength as tolerated with respect to tissue healing. Manual therapy PRN to improve/maintain ROM, prevent adhesion, reduce pain.

## 2025-05-23 ENCOUNTER — TREATMENT (OUTPATIENT)
Dept: PHYSICAL THERAPY | Facility: CLINIC | Age: 75
End: 2025-05-23
Payer: COMMERCIAL

## 2025-05-23 DIAGNOSIS — M47.816 LUMBAR SPONDYLOSIS: ICD-10-CM

## 2025-05-23 DIAGNOSIS — M47.812 CERVICAL SPONDYLOSIS WITHOUT MYELOPATHY: ICD-10-CM

## 2025-05-23 DIAGNOSIS — R53.1 GENERAL WEAKNESS: Primary | ICD-10-CM

## 2025-05-23 PROCEDURE — 97140 MANUAL THERAPY 1/> REGIONS: CPT | Mod: GP | Performed by: PHYSICAL THERAPIST

## 2025-05-23 PROCEDURE — 97110 THERAPEUTIC EXERCISES: CPT | Mod: GP | Performed by: PHYSICAL THERAPIST

## 2025-05-23 ASSESSMENT — PAIN - FUNCTIONAL ASSESSMENT: PAIN_FUNCTIONAL_ASSESSMENT: 0-10

## 2025-05-23 ASSESSMENT — PAIN SCALES - GENERAL: PAINLEVEL_OUTOF10: 0 - NO PAIN

## 2025-05-29 NOTE — PROGRESS NOTES
Patient Name: Emilia Murray  MRN: [id number]  Today's Date: 6/3/2025  Time Calculation  Start Time: 0701  Stop Time: 0742  Time Calculation (min): 41 min     Diagnosis:  Problem List Items Addressed This Visit           ICD-10-CM    General weakness - Primary R53.1     Other Visit Diagnoses         Codes      Lumbar spondylosis     M47.816      Cervical spondylosis without myelopathy     M47.812            Insurance:  7/11  *2025 // AUTH FOR 10 VISITS, 4/11-7/9/25 // cervical, lumbar spondylosis, // 0% coins, no ded/oop/copay, 50v viviana yr (0v used as of 3/25/2025), PA req  *2025 // PA REQ / Rufusn Authorization for 1 Visit   USE M47.816     Eval [id number] 4/2/25 to 4/16/25 Per [id number] 0% coins, no ded/oop/copay, 50v viviana yr (0v used as of 3/25/2025), PA [place]  [place] PPO (PA REQ)// Medicare A/B   20560 - 205261 not covered  $0 spent towards therapy cap as of 3/25/25.  KX mod needed after ~20v viviana yr.  (0v used as of 3/25/25)  20560 - 20561, 59481 not covered  POC: 4.7.25 - 7.6.25  Visit # 7    Precautions:  Precautions  Precautions Comment: no new falls    Subjective:  Patient states that she had some increase in LBP and cervical neck pain since last session.  She thinks it may have been from the cable column machine.  States she had a minor procedure last week and needs to try to take it easy a bit.      Pain:  Pain Assessment: 0-10  0-10 (Numeric) Pain Score: 3  Pain Location: Neck and back     Objective:  Able to passively scapular lift B     Outcome Measure:  nt    Treatment:  There ex 29857: 18/1  Nustep LE and UE only 5 min L 2 -   Stair HS stretch 2 x :20 NV  Pull downs Gtubing x 15  Standing row retractions Gtubing   Supine horiz abd RTB x 15  Wrist stretches - WB on plinth  No completed  Supine TA x 5  Seated cervical retraction   Seated scapular retraction 5  Standing retraction rows RTB x 10  NV  TA Hooklying hip ER with contralateral isometric BTB x :60  TA Hooklying hip add with ball x 10   Brandenff  walk outs x 5 Rtubing   NV  Body mechanics - TA stability with standing        Manual therapy 89865: 23/2  STM, MRF, TrP!  Supine  UT, Cervical paraspinals, gentle cervical distraction SOR, R hand   SL  Levator, UT, interscapular, scapular PROM scapular lift  HEP/Education:  Table wrist stretch, self STM R hand palmar surface    Assessment:  Patient tolerated today's session very well.  She is improving on her STR with noted improvements in scapular and thoracic muscular tightness as I am now able to passively lift B scapula.  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  Patient demonstrates progressing symptom and STR improvement but cont to need additional manual therapy and guided there ex to optimize outcomes.    Patient demonstrates a working understanding of treatment plan and HEP requirements and how PT plan of care relates to PT and personal goal achievement    Plan:  Progress with functional strength as tolerated with respect to tissue healing. Manual therapy PRN to improve/maintain ROM, prevent adhesion, reduce pain.

## 2025-05-30 ENCOUNTER — APPOINTMENT (OUTPATIENT)
Dept: PHYSICAL THERAPY | Facility: CLINIC | Age: 75
End: 2025-05-30
Payer: COMMERCIAL

## 2025-05-30 DIAGNOSIS — R53.1 GENERAL WEAKNESS: Primary | ICD-10-CM

## 2025-06-03 ENCOUNTER — TREATMENT (OUTPATIENT)
Dept: PHYSICAL THERAPY | Facility: CLINIC | Age: 75
End: 2025-06-03
Payer: COMMERCIAL

## 2025-06-03 DIAGNOSIS — R53.1 GENERAL WEAKNESS: Primary | ICD-10-CM

## 2025-06-03 DIAGNOSIS — M47.816 LUMBAR SPONDYLOSIS: ICD-10-CM

## 2025-06-03 DIAGNOSIS — M47.812 CERVICAL SPONDYLOSIS WITHOUT MYELOPATHY: ICD-10-CM

## 2025-06-03 PROCEDURE — 97140 MANUAL THERAPY 1/> REGIONS: CPT | Mod: GP | Performed by: PHYSICAL THERAPIST

## 2025-06-03 PROCEDURE — 97110 THERAPEUTIC EXERCISES: CPT | Mod: GP | Performed by: PHYSICAL THERAPIST

## 2025-06-03 ASSESSMENT — PAIN SCALES - GENERAL: PAINLEVEL_OUTOF10: 3

## 2025-06-03 ASSESSMENT — PAIN - FUNCTIONAL ASSESSMENT: PAIN_FUNCTIONAL_ASSESSMENT: 0-10

## 2025-06-05 NOTE — PROGRESS NOTES
Patient Name: Emilia Murray  MRN: [id number]  Today's Date: 6/13/2025        Diagnosis:  Problem List Items Addressed This Visit           ICD-10-CM    General weakness - Primary R53.1       Insurance:  8/11 *2025 // AUTH FOR 10 VISITS, 4/11-7/9/25 // cervical, lumbar spondylosis, // 0% coins, no ded/oop/copay, 50v viviana yr (0v used as of 3/25/2025), PA req  *2025 // PA REQ / [place] Authorization for 1 Visit   USE M47.816     Eval [id number] 4/2/25 to 4/16/25 Per [id number] 0% coins, no ded/oop/copay, 50v viviana yr (0v used as of 3/25/2025), PA req  [place] PPO (PA REQ)// Medicare A/B   20560 - 205261 not covered  $0 spent towards therapy cap as of 3/25/25.  KX mod needed after ~20v viviana yr.  (0v used as of 3/25/25)  20560 - 20561, 63881 not covered  POC: 4.7.25 - 7.6.25  Visit # 8    Precautions:       Subjective:  ***    Pain:      Location: ***   Description: ***   Aggravating Factors: {Aggravating Factors:17872}   Relieving Factors:  {Relieving Factors:89069}    Objective:  ***    Outcome Measure:  {PT Outcome Measures:06632}     Treatment:  There ex 27268: 18/1  Nustep LE and UE only 5 min L 2 -   Stair HS stretch 2 x :20 NV  Pull downs Gtubing x 15  Standing row retractions Gtubing   Supine horiz abd RTB x 15  Wrist stretches - WB on plinth  No completed  Supine TA x 5  Seated cervical retraction   Seated scapular retraction 5  Standing retraction rows RTB x 10  NV  TA Hooklying hip ER with contralateral isometric BTB x :60  TA Hooklying hip add with ball x 10   Paloff walk outs x 5 Rtubing   NV  Body mechanics - TA stability with standing        Manual therapy 18934: 23/2  STM, MRF, TrP!  Supine  UT, Cervical paraspinals, gentle cervical distraction SOR, R hand   SL  Levator, UT, interscapular, scapular PROM scapular lift  HEP/Education:  Table wrist stretch, self STM R hand palmar surface      Assessment:  Patient tolerated today's session *** Patient demonstrates ***.    Patient demonstrates a working  understanding of treatment plan and HEP requirements and how PT plan of care relates to PT and personal goal achievement    Plan:  Progress with functional strength as tolerated with respect to tissue healing. Manual therapy PRN to improve/maintain ROM, prevent adhesion, reduce pain.

## 2025-06-13 ENCOUNTER — TREATMENT (OUTPATIENT)
Dept: PHYSICAL THERAPY | Facility: CLINIC | Age: 75
End: 2025-06-13
Payer: COMMERCIAL

## 2025-06-13 DIAGNOSIS — R53.1 GENERAL WEAKNESS: Primary | ICD-10-CM

## 2025-06-13 DIAGNOSIS — M47.812 CERVICAL SPONDYLOSIS WITHOUT MYELOPATHY: ICD-10-CM

## 2025-06-13 DIAGNOSIS — M47.816 LUMBAR SPONDYLOSIS: ICD-10-CM

## 2025-06-16 ENCOUNTER — DOCUMENTATION (OUTPATIENT)
Dept: PHYSICAL THERAPY | Facility: CLINIC | Age: 75
End: 2025-06-16
Payer: COMMERCIAL

## 2025-06-16 DIAGNOSIS — R53.1 GENERAL WEAKNESS: Primary | ICD-10-CM

## 2025-06-16 NOTE — PROGRESS NOTES
Physical Therapy    Discharge Summary    Name: Emilia Murray  MRN: [id number]  : 1950  Date: 25    Discharge Summary: PT    Discharge Information: Date of discharge 25, Date of last visit 6.3.25, Date of evaluation 25, Number of attended visits 7, Referred by Dr. Caba, and Referred for cervical and lumbar spondylosis    Therapy Summary: Ms. Murray's clinical presentation includes characteristics as noted during today's evaluation consistent with needing skilled physical therapy for cervical and lumbar spondylosis and a PT diagnosis of muscular weakness.  Patient presents with deficits including a decrease in postural awareness, strength, ROM and an increase in STR which is increasing patient's pain and limiting their ability to return to PLOF. Clinical findings indicate chronic history of back and neck pain for many years resulting in postural imbalances, muscular weakness especially in B LE, hips, core, interscapular leading to functional impairments and difficulty performing her job requiring the need for skilled PT services.     Discharge Status: last visit was cancelled by PT due to illness, unable to reassess     Rehab Discharge Reason: Other per patient report - she felt much better and was glad she went to PT

## 2025-06-20 ENCOUNTER — APPOINTMENT (OUTPATIENT)
Dept: PHYSICAL THERAPY | Facility: CLINIC | Age: 75
End: 2025-06-20
Payer: COMMERCIAL

## 2025-06-20 DIAGNOSIS — R53.1 GENERAL WEAKNESS: Primary | ICD-10-CM

## 2025-06-27 ENCOUNTER — APPOINTMENT (OUTPATIENT)
Dept: PHYSICAL THERAPY | Facility: CLINIC | Age: 75
End: 2025-06-27
Payer: COMMERCIAL

## 2025-06-27 DIAGNOSIS — R53.1 GENERAL WEAKNESS: Primary | ICD-10-CM

## 2025-07-25 ENCOUNTER — APPOINTMENT (OUTPATIENT)
Dept: PHYSICAL THERAPY | Facility: CLINIC | Age: 75
End: 2025-07-25
Payer: COMMERCIAL

## 2025-07-25 DIAGNOSIS — R53.1 GENERAL WEAKNESS: Primary | ICD-10-CM

## 2025-07-28 ENCOUNTER — TELEPHONE (OUTPATIENT)
Dept: PAIN MEDICINE | Facility: CLINIC | Age: 75
End: 2025-07-28
Payer: COMMERCIAL

## 2025-07-28 NOTE — TELEPHONE ENCOUNTER
Patient scheduled for a follow up this week after calling to request a refill of hydrocodone. Last office visit was 4/21

## 2025-07-30 ENCOUNTER — OFFICE VISIT (OUTPATIENT)
Dept: PAIN MEDICINE | Facility: CLINIC | Age: 75
End: 2025-07-30
Payer: COMMERCIAL

## 2025-07-30 DIAGNOSIS — M54.16 LUMBAR RADICULOPATHY: Primary | ICD-10-CM

## 2025-07-30 PROCEDURE — 99214 OFFICE O/P EST MOD 30 MIN: CPT | Performed by: PAIN MEDICINE

## 2025-07-30 PROCEDURE — 1159F MED LIST DOCD IN RCRD: CPT | Performed by: PAIN MEDICINE

## 2025-07-30 PROCEDURE — 1125F AMNT PAIN NOTED PAIN PRSNT: CPT | Performed by: PAIN MEDICINE

## 2025-07-30 RX ORDER — HYDROCODONE BITARTRATE AND ACETAMINOPHEN 5; 325 MG/1; MG/1
1 TABLET ORAL 2 TIMES DAILY PRN
Qty: 60 TABLET | Refills: 0 | Status: SHIPPED | OUTPATIENT
Start: 2025-07-30 | End: 2025-08-29

## 2025-07-30 ASSESSMENT — PAIN - FUNCTIONAL ASSESSMENT: PAIN_FUNCTIONAL_ASSESSMENT: 0-10

## 2025-07-30 ASSESSMENT — PAIN DESCRIPTION - DESCRIPTORS: DESCRIPTORS: ACHING;BURNING

## 2025-07-30 ASSESSMENT — PAIN SCALES - GENERAL: PAINLEVEL_OUTOF10: 8

## 2025-07-30 NOTE — H&P
History Of Present Illness  Emilia Murray is a 75 y.o. female presenting with chronic back pain the patient is confirming that she had great success with the lumbar epidural steroid injection that she received in May targeting the L4-L5 level was initially 100% better   She still believes that this shot is continuing to assist her with the back pain and gradually at start fading effect she continues to be on the Norco 5/325 averaging between 1-1/2 to 2 pills/day she is still have a few medications left from her last prescription denying any side effect associated with the usage of the Norco.  Rating her pain today at a level of 8 out of 10 described worse in the lower lumbar spine area describing it as a burning sensation in the back and radiating down to the lower extremity bilaterally aggravated by trying to stand up from a sitting position.     Past Medical History  Medical History[1]  Surgical History  Surgical History[2]  Social History  She has no history on file for tobacco use, alcohol use, and drug use.    Family History  Family History[3]     Allergies  Allergies[4]  Review of Systems   All 13 systems were reviewed and are within normal levels except as noted below or per HPI. Positive and pertinent negative responses are noted below or in the HPI   Denied any fever or chills. No weight loss and no night sweats. No cough or sputum production. No diarrhea   No constipation  Some  bladder prolapse leading to incontinence  and no bowel incontinence . No skin changes.   Denied opioids diversion and abuse and denies alcoholism. Denies overuse of  pain medications.   Physical Exam       Past medical history no interval changes has been noted    On physical examination    General   Alert, oriented x3 pleasant and cooperative. Does not look in any major distress.    HEENT  Pupils normal in size. Ears, nose, mouth, and throat appear to be in normal condition.  Head atraumatic      No signs of sedation or signs of  withdrawal apparent.    Psychiatric   No signs of depression apparent.    Neuro   No focal neurological deficit apparent. Ambulation at baseline.      Respiratory  No respiratory distress     Abdomen  no distention     Skin  No skin markings supportive of recent IV drug usage .    Cardiovascular  Regular rate and rhythm    Last Recorded Vitals  There were no vitals taken for this visit.    Assessment/Plan   75 years old with history and physical examination supportive of chronic back pain status post epidural steroid injection with a great response currently with reoccurrence of the symptoms and lumbar radiculopathy maintained currently on Norco with positive response  Plan   I believe the benefits of the continuation of the opiate outweighs the risk.  Have considered the risks of abuse, dependence, addiction and diversion . Patient has described positive response to the opiate therapy. Patient denies any side effects associated with the usage of the opiate. Patient did not elicit any signs supportive of any opioid misuse or abuse. The review of the [place] automated reporting prescription is not suggestive of any worrisome pattern. Patient believes the usage of the opioid as improve the quality of life and allow the patient to continue to participate in day-to-day activity. Therefore I would recommend with the continuation of the opiate therapy and I will be refilling the patient prescription.  Norco 5/325 1 pill p.o. twice daily as needed   I advised the patient to always take the least needed of the medication to keep the  pain under control. I encouraged the patient to keep a pain dairy so that during subsequent visit we could look at the trend of the  response to the pain medication and titrate the medication accordingly. Patient verbalized understanding and agreement with the plan and will be following-up with  the pain clinic within 3 months duration, or if needed any sooner.  Patient is being treated with  opioid therapy for pain and is responding appropriately.  There are NO signs of opioid intoxication, abuse, addiction or withdrawal.  Pupils are equal, reactive to light bilateral, appropriate speech and cognition. Patient denies any opioid induced constipation. The [place] registry followed periodically, urine toxicology completed and appropriate.     Patient is advised and warned  in specific detail about potential benefits of opioids along with risks and side effects including, but not limited to, dependency, addiction, tolerance, hyperalgesia, anxiety, depression, insomnia, endocrine changes, immunologic disturbances, respiratory depression and death.     Caution advised regarding the use of medications prescribed at this office and specific mention made regarding not to drive or operate heavy machinery if feeling side effects from this the medications. Patient  expressed an understanding in regards to these particular concerns.     Discussed non-opioid options including (But no limited), physical wellness, antiinflammatory diet, icing and heating, meditation, relaxation, massage and acupuncture.    We will continue to monitor and adjust medications as needed.    I advised the patient that she would benefit from repeating the lumbar epidural steroid injection to be performed under fluoroscopic guidance targeting the L4-L5 level with injection of contrast material to confirm needle placement benefits and risk of the procedure were discussed and she would like to proceed      The above clinical summary has been dictated with voice recognition software. It has not been proofread for grammatical errors, typographical mistakes, or other semantic inconsistencies.    Thank you for visiting our office today. It was our pleasure to take part in your healthcare.     Please do not hesitate to contact the pain clinic after your visit with any questions or concerns at  M-F 8-4 pm       Kelli Caba ,  M.D.  Medical Director , Division of Pain Medicine [facility]   of Anesthesiology and Pain Medicine  [facility] [place] [address]     Office: [phone]  Fax: [phone]        Kelli Caba MD       [1]   Past Medical History:  Diagnosis Date    Personal history of other diseases of the digestive system     History of gastrointestinal hemorrhage    Personal history of other diseases of the musculoskeletal system and connective tissue     History of chronic back pain   [2]   Past Surgical History:  Procedure Laterality Date    OTHER SURGICAL HISTORY  04/13/2021    Hysterectomy    OTHER SURGICAL HISTORY  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  [4] No Known Allergies

## 2025-07-30 NOTE — PROGRESS NOTES
Subjective   Emilia Murray is a 75 y.o. female who presents for evaluation of her back pain.  The pain is located in the lumbar area and radiates to up to thoracic right side .  The pt here for medication refill.     I have reviewed the nurses notes and am aware of family/social history.     Review of Systems    Objective   Physical Exam    ASSESSMENT:     PLAN:   Discussed treatment plan with patient.   Call or return to clinic prn if these symptoms worsen or fail to improve as anticipated.     Samaria Kamara RN

## 2025-08-28 ENCOUNTER — APPOINTMENT (OUTPATIENT)
Dept: PAIN MEDICINE | Facility: CLINIC | Age: 75
End: 2025-08-28
Payer: MEDICARE

## 2025-09-02 DIAGNOSIS — M54.16 LUMBAR RADICULOPATHY: ICD-10-CM

## 2025-09-02 RX ORDER — HYDROCODONE BITARTRATE AND ACETAMINOPHEN 5; 325 MG/1; MG/1
1 TABLET ORAL 2 TIMES DAILY PRN
Qty: 60 TABLET | Refills: 0 | Status: SHIPPED | OUTPATIENT
Start: 2025-09-02 | End: 2025-10-03

## 2025-09-02 RX ORDER — HYDROCODONE BITARTRATE AND ACETAMINOPHEN 5; 325 MG/1; MG/1
1 TABLET ORAL 2 TIMES DAILY PRN
Qty: 60 TABLET | Refills: 0 | OUTPATIENT
Start: 2025-09-02